# Patient Record
Sex: MALE | Race: OTHER | HISPANIC OR LATINO | URBAN - METROPOLITAN AREA
[De-identification: names, ages, dates, MRNs, and addresses within clinical notes are randomized per-mention and may not be internally consistent; named-entity substitution may affect disease eponyms.]

---

## 2021-11-27 ENCOUNTER — EMERGENCY (EMERGENCY)
Facility: HOSPITAL | Age: 21
LOS: 1 days | Discharge: HOME | End: 2021-11-27
Attending: EMERGENCY MEDICINE
Payer: MEDICARE

## 2021-11-27 VITALS
HEIGHT: 75 IN | RESPIRATION RATE: 20 BRPM | SYSTOLIC BLOOD PRESSURE: 143 MMHG | HEART RATE: 86 BPM | WEIGHT: 241.19 LBS | TEMPERATURE: 99 F | OXYGEN SATURATION: 94 % | DIASTOLIC BLOOD PRESSURE: 83 MMHG

## 2021-11-27 LAB — SARS-COV-2 RNA SPEC QL NAA+PROBE: SIGNIFICANT CHANGE UP

## 2021-11-27 PROCEDURE — 99283 EMERGENCY DEPT VISIT LOW MDM: CPT

## 2021-11-27 PROCEDURE — 99283 EMERGENCY DEPT VISIT LOW MDM: CPT | Mod: 25

## 2021-11-27 PROCEDURE — 94640 AIRWAY INHALATION TREATMENT: CPT

## 2021-11-27 PROCEDURE — U0005: CPT

## 2021-11-27 PROCEDURE — U0003: CPT

## 2021-11-27 PROCEDURE — 99053 MED SERV 10PM-8AM 24 HR FAC: CPT

## 2021-11-27 RX ORDER — FLUTICASONE PROPIONATE 50 MCG
1 SPRAY, SUSPENSION NASAL
Refills: 0 | Status: DISCONTINUED | OUTPATIENT
Start: 2021-11-27 | End: 2021-12-01

## 2021-11-27 RX ADMIN — Medication 1 SPRAY(S): at 02:41

## 2021-11-27 NOTE — ED PROVIDER NOTE - PHYSICAL EXAMINATION
Vital signs noted, see flowsheet.  General: NAD, well appearing, non-toxic.  HEENT: NC/AT, MMM. Auricles/tragi/mastoid non-tender b/l.  TM and ear canal normal b/l without erythema or bulging.  Conjunctiva and sclera clear b/l, EOMI, no ocular redness, discharge or swelling. +rhinorrhea, no sinus tenderness, +Nasal inflammation.  Mouth and pharynx with normal mucosa, no erythema, exudate or vesicles. Uvula midline.   Neck: Supple  Respiratory: No distress, CTA  Cardiac: RRR, pulses 2+ bilateral   Skin: No evidence of rash  Neuro: Awake, alert, oriented

## 2021-11-27 NOTE — ED PROVIDER NOTE - CARE PROVIDER_API CALL
Mathew Garcia)  Otolaryngology  [address] [place] [address], [place] [address]  Phone: [phone]  Fax: [phone]  Follow Up Time: 1-3 Days

## 2021-11-27 NOTE — ED PROVIDER NOTE - ATTENDING CONTRIBUTION TO CARE
Pt presenting with nasal congestion x 4 months. Uses Afrin daily. Physical exam shows swollen nasal turbinates R>L. Pt advised to stop using Afrin as it causes rebound congestion. Dc home with ENT and advised to use flonase.       I performed a history and physical exam of the patient and discussed their management with the advanced care provider. I reviewed the advanced care provider's note and agree with the documented findings and plan of care. My medical decision making and objective findings are found above.

## 2021-11-27 NOTE — ED PROVIDER NOTE - OBJECTIVE STATEMENT
19 y/o M with no PMHx presents to ED c/o nasal congestion, dry cough and intermittent sinus pressure for past 4 months. Pt report occasional mild headache but is not present currently. Reports difficulty breathing through his nose secondary to congestion. Pt using afrin multiple times per day without relief. Denies fever, chest pain, dyspnea, visual changes, abd pain.

## 2021-11-27 NOTE — ED PROVIDER NOTE - CLINICAL SUMMARY MEDICAL DECISION MAKING FREE TEXT BOX
21 y/o M with no PMHx presents to ED c/o nasal congestion, dry cough and intermittent sinus pressure for past 4 months. Also with asymptomatic hypertension  -Advised to DC afrin, start Claritin or zyrtec, will obtain COVID swab and outpatient f/u with ENT and PMD  -Discussed results, plan and return precautions with patient, pt verbalized understanding and agreement of plan

## 2021-11-27 NOTE — ED PROVIDER NOTE - NSFOLLOWUPINSTRUCTIONS_ED_ALL_ED_FT
- Rich un seguimiento con munoz médico de atención primaria en 1 o 2 días. Si no puede hacer un seguimiento con munoz médico de atención primaria, regrese al servicio de urgencias por cualquier problema urgente.  - Busque atención médica inmediata ante cualquier signo o síntoma nuevo, que empeore o que le preocupe.  - Si tiene dificultades para realizar un seguimiento, rasta goldberg: [phone] o visite [email or web address]-care para obtener un médico o especialista de [facility] que acepte munoz seguro en munoz área.      Si usted no tiene un médico de primaria por favor llame para hacer kristel christine en cualquiera de las clínicas de atención primaria que se enumeran a continuación:    [facility]  [address] [place] [address]  Phone: [phone]    [place] [address]  Phone: [phone]   ¡Sentirse mejor!       Rinitis alérgica    LO QUE NECESITA SABER:    La rinitis alérgica o fiebre del heno es la inflamación del interior de la nariz. La inflamación es kristel reacción a los alérgenos que se encuentran en el aire. Un alérgeno puede ser cualquier cosa que cause kristel reacción alérgica. Las alergias a diferentes tipos de maleza, césped, árboles o moho frecuentemente causan la rinitis alérgica. Los ácaros del polvo, las cucarachas, el pelo de las mascotas o el moho del interior de las mueller también pueden causar rinitis alérgica.    INSTRUCCIONES SOBRE EL MADDI HOSPITALARIA:    Rasta al 911 en gissel de presentar lo siguiente:  •Usted tiene dolor en el pecho o falta de aire.          Regrese a la noel de emergencias si:  •Usted tiene dolor intenso.      •Usted expectora maykel.      Comuníquese con munoz médico si:  •Tiene fiebre.      •Usted tiene dolor de oído o sinusitis, o dolor de scarlett.      •Ang[place] síntomas empeoran, aún después del tratamiento.      •A usted le sale de la nariz moco amarillo, verdoso, café o con maykel.      •Munoz nariz le sangra o le duele por dentro.      •Usted tiene dificultad para dormir debido a angeles síntomas.      •Usted tiene preguntas o inquietudes acerca de munoz condición o cuidado.      Medicamentos:  •Los medicamentosayudan a disminuir angeles síntomas y aclarar munoz congestión nasal.      •Petty angeles medicamentos kenisha se le haya indicado.Consulte con munoz médico si usted christine que munoz medicamento no le está ayudando o si presenta efectos secundarios. Infórmele si es alérgico a algún medicamento. Mantenga kristel lista actualizada de los medicamentos, las vitaminas y los productos herbales que vignesh. Incluya los siguientes datos de los medicamentos: cantidad, frecuencia y motivo de administración. Traiga con usted la lista o los envases de las píldoras a angeles citas de seguimiento. Lleve la lista de los medicamentos con usted en gissel de kristel emergencia.      Cómo manejar la rinitis alérgica:La mejor forma de manejar la rinitis alérgica es evitar alérgenos que puedan provocar angeles síntomas. Cualquiera de los siguientes puede llegar a disminuir angeles síntomas:   •Enjuáguese la nariz y los senos paranasalescon kristel solución de agua con sal o use un espray nasal de agua salina. [place] ayudará a diluir la mucosidad en la nariz eliminando el polen y la suciedad. También ayudará a reducir la inflamación para que usted pueda respirar normalmente. Pregúntele a munoz médico con cuánta frecuencia debe usted enjuagarse la nariz con el espray.      •Reduzca los ácaros del polvo.Lave sábanas y toallas en [place] kristel vez por semana. Cubra angeles almohadas y colchones con fundas libres de alérgenos. Limite el número de animales de olga y muñecos blandos que tiene munoz nathalia. Lave periódicamente en [place] los juguetes de munoz nathalia. Use kristel aspiradora que tenga filtro de aire y aspire semanalmente. Si es posible, deshágase de alfombras y fletcher. Estas recogen el polvo y los ácaros del polvo.      •Reduzca el polen.Mantenga las ventanas y miryam cerradas en la casa y brian. Permanezca adentro cuando el conteo de polen esté muy elevado. Encienda munoz aire acondicionado en reciclar y cambie los filtros de aire con frecuencia. Báñese y lávese el kulwinder antes de dormir todas las noches para ron el polen que haya acumulado en el kulwinder.      •Reduzca la caspa animal.Si es posible, no tenga gatos, perros, pájaros u otras mascotas. Si ya tiene mascotas en el hogar, manténgalas lejos de los dormitorios y habitaciones alfombradas. Báñelos con frecuencia.      •Reduzca el moho.No pase tiempo en sótanos. Compre plantas artificiales en vez de plantas de verdad. Mantenga la humedad de munoz hogar a menos de 45%. Asegúrese que no haya estanques y charcos en munoz casa o patio.      •No fume.Evite estar con otras personas que fumen. Pida información a munoz médico si usted actualmente fuma y necesita ayuda para dejar de fumar.      Acuda a angeles consultas de control con munoz médico según le indicaron.Es probable que usted tenga que ashley un especialista en alergias frecuentemente y para controlar angeles síntomas. Anote angeles preguntas para que se acuerde de hacerlas jackson angeles visitas.    - Munoz lectura de la presión arterial fue maddi mientras estaba en el [facility] de Emergencias, por favor controle munoz presión arterial en casa y rich un seguimiento con el médico de cabecera    La hipertensión, comúnmente llamada presión arterial maddi, es cuando la fuerza del bombeo de maykel a través de las arterias es demasiado arsalan. La hipertensión obliga al corazón a trabajar más luis angel para bombear maykel. [place] arterias pueden volverse estrechas o rígidas. Tener hipertensión no tratada o no controlada jackson un dominic período de tiempo puede causar ataque cardíaco, accidente cerebrovascular, enfermedad renal y otros problemas. Si comenzó con un medicamento, tómese exactamente kenisha se lo recetó munoz profesional de la nay.    BUSQUE ATENCIÓN MÉDICA INMEDIATA SI TIENE ALGUNO DE LOS SIGUIENTES SÍNTOMAS: dolor de scarlett intenso, cambios en munoz visión, confusión, dolor en el pecho, dolor abdominal, vómitos o dificultad para respirar.

## 2021-11-27 NOTE — ED PROVIDER NOTE - PATIENT PORTAL LINK FT
You can access the FollowMyHealth Patient Portal offered by [facility] by registering at the following website: [email or web address]. By joining [facility]’s FollowMyHealth portal, you will also be able to view your health information using other applications (apps) compatible with our system.

## 2021-11-27 NOTE — ED PROVIDER NOTE - DISCHARGE REVIEW MATERIAL PRESENTED
Anesthesia Evaluation     Patient summary reviewed and Nursing notes reviewed   NPO Solid Status: > 8 hours  NPO Liquid Status: > 8 hours           Airway   Mallampati: II  TM distance: >3 FB  Neck ROM: full  No difficulty expected  Dental - normal exam     Pulmonary - normal exam   (+) sleep apnea,   Cardiovascular - normal exam    ECG reviewed    (+) hypertension, valvular problems/murmurs, CAD, dysrhythmias Atrial Fib, angina, hyperlipidemia,       Neuro/Psych  (+) TIA, CVA, numbness, psychiatric history,     GI/Hepatic/Renal/Endo    (+)  hiatal hernia, GERD, PUD,  diabetes mellitus,     Musculoskeletal     Abdominal  - normal exam    Bowel sounds: normal.   Substance History      OB/GYN          Other   arthritis,    history of cancer                    Anesthesia Plan    ASA 3     MAC     intravenous induction          .

## 2022-05-16 ENCOUNTER — EMERGENCY (EMERGENCY)
Facility: HOSPITAL | Age: 22
LOS: 1 days | Discharge: HOME | End: 2022-05-16
Attending: EMERGENCY MEDICINE
Payer: SELF-PAY

## 2022-05-16 VITALS
RESPIRATION RATE: 20 BRPM | TEMPERATURE: 99 F | WEIGHT: 229.94 LBS | OXYGEN SATURATION: 95 % | HEART RATE: 103 BPM | SYSTOLIC BLOOD PRESSURE: 147 MMHG | DIASTOLIC BLOOD PRESSURE: 79 MMHG | HEIGHT: 75 IN

## 2022-05-16 VITALS
OXYGEN SATURATION: 96 % | RESPIRATION RATE: 20 BRPM | SYSTOLIC BLOOD PRESSURE: 149 MMHG | HEART RATE: 74 BPM | DIASTOLIC BLOOD PRESSURE: 77 MMHG | TEMPERATURE: 100 F

## 2022-05-16 LAB
FLUAV SUBTYP SPEC NAA+PROBE: DETECTED
RAPID RVP RESULT: DETECTED
SARS-COV-2 RNA SPEC QL NAA+PROBE: SIGNIFICANT CHANGE UP

## 2022-05-16 PROCEDURE — 99053 MED SERV 10PM-8AM 24 HR FAC: CPT

## 2022-05-16 PROCEDURE — 71045 X-RAY EXAM CHEST 1 VIEW: CPT

## 2022-05-16 PROCEDURE — 99284 EMERGENCY DEPT VISIT MOD MDM: CPT

## 2022-05-16 PROCEDURE — 71045 X-RAY EXAM CHEST 1 VIEW: CPT | Mod: 26

## 2022-05-16 PROCEDURE — 99285 EMERGENCY DEPT VISIT HI MDM: CPT | Mod: 25

## 2022-05-16 PROCEDURE — 0225U NFCT DS DNA&RNA 21 SARSCOV2: CPT

## 2022-05-16 NOTE — ED PROVIDER NOTE - ATTENDING APP SHARED VISIT CONTRIBUTION OF CARE
I personally saw the patient with the PA, and completed the key components of the history and physical exam. I then discussed the management plan with the PA.    · CONSTITUTIONAL: Well appearing, awake, alert, oriented to person, place, time/situation and in no apparent distress.  · EYES: Clear bilaterally, pupils equal, round and reactive to light.  · CARDIAC: Normal rate, regular rhythm.  Heart sounds S1, S2.  No murmurs, rubs or gallops.  · RESPIRATORY: Breath sounds clear and equal bilaterally.  · GASTROINTESTINAL: Abdomen soft, non-tender, no guarding.  · MUSCULOSKELETAL: Spine appears normal, range of motion is not limited, no muscle or joint tenderness  · NEUROLOGICAL: Alert and oriented, no focal deficits, no motor or sensory deficits.  · SKIN: Skin normal color for race, warm, dry and intact. No evidence of rash.

## 2022-05-16 NOTE — ED PROVIDER NOTE - OBJECTIVE STATEMENT
20 yo male no PMHx presents to ED for evaluation. Reports cough x1 week. Following cough, spits up phlegm. Tmax 102F 2 hours ago, medicated with acetaminophen 1000mg. Fever began yesterday morning. Eating/drinking normally. No further complaints at this time.   Denies body aches, abdominal pain, diarrhea.

## 2022-05-16 NOTE — ED PROVIDER NOTE - CLINICAL SUMMARY MEDICAL DECISION MAKING FREE TEXT BOX
22 yo male no PMHx presents to ED for evaluation of persistent cough. 20 yo male no PMHx presents to ED for evaluation of persistent cough. Nontox, afebrile. CXR negative. Improved with medications in ED. Patient to be discharged. Patient and/or guardian provided verbal/written discharge instructions and return precautions. Patient and/or guardian expresses understanding and agreement.

## 2022-05-16 NOTE — ED PROVIDER NOTE - NSFOLLOWUPINSTRUCTIONS_ED_ALL_ED_FT
- Prescription sent to pharmacy.  - Honey.   - Please bring all documentation from your ED visit to any related future follow up appointment.  - Please call to schedule follow up appointment with your primary care physician within 24-48 hours.  - Please seek immediate medical attention or return to the ED for any new/worsening, signs/symptoms, or concerns.    Feel better!    - Receta enviada a farmacia.  - Miel.  - Traiga toda la documentación de hamilton visita al ED a cualquier christine de seguimiento futura relacionada.  - Llame para programar kristel christine de seguimiento con hamilton médico de atención primaria dentro de las 24 a 48 horas.  - Busque atención médica inmediata o regrese al servicio de urgencias por cualquier signo/síntoma o inquietud nuevos/empeorados.    ¡Sentirse mejor!      Tos en los adultos    Cough, Adult      La tos es un reflejo que despeja la garganta y las vías respiratorias (sistema respiratorio). Toser ayuda a curar y proteger los pulmones. Es normal toser a veces, bobby si la tos aparece junto con otros síntomas o si dura mucho tiempo, puede indicar kristel afección que necesita tratamiento. Kristel tos aguda puede durar entre 2 o 3 semanas, mientras que kristel tos crónica puede durar 8 semanas o más tiempo.    Por lo general, las causas de la tos son las siguientes:  •Infección del sistema respiratoriopor virus o bacterias.      •Aspirar sustancias que irritan los pulmones.      •Alergias.      •Asma.      •Mucosidad que se desliza por la parte posterior de la garganta (goteo posnasal).      •Fumar.      •Ácido que vuelve del estómago hacia el esófago (reflujo gastroesofágico).      •Ciertos medicamentos.      •Problemas pulmonares crónicos.      •Otras enfermedades, kenisha insuficiencia cardíaca o un coágulo de maykel en el pulmón (embolia pulmonar).        Siga estas instrucciones en hamilton casa:    Medicamentos     •[place] los medicamentos de venta edouard y los recetados solamente kenisha se lo haya indicado el médico.      •Hable con el médico antes de val un medicamento para la tos (antitusivo).        Estilo de ravinder      •Evite el humo del cigarrillo. No consuma ningún producto que contenga nicotina o tabaco, kenisha cigarrillos, cigarrillos electrónicos y tabaco de mascar. Si necesita ayuda para dejar de fumar, consulte al médico.      •Estrada suficiente líquido kenisha para mantener la orina de color amarillo pálido.      •Evite la cafeína.      • No estrada alcohol si el médico se lo prohíbe.        Instrucciones generales      •Esté muy atento a los cambios en la tos. Informe al médico acerca de los cambios.      •Siempre cúbrase la boca al toser.      •Evite las cosas que lo carlos toser, kenisha perfumes, saroj, productos de limpieza o humo de fogatas o de tabaco.      •Si el aire está seco, use un humidificador o un vaporizador de jovon fría en la habitación o en la casa para ayudar a aflojar las secreciones.      •Si la tos empeora por la noche, pruebe a dormir en posición semierguida.      •Descanse todo lo que sea necesario.      •Concurra a todas las visitas de seguimiento kenisha se lo haya indicado el médico. [place] es importante.        Comuníquese con un médico si:    •Tiene nuevos síntomas.      •Tose y escupe pus.      •Tiene kristel tos que no mejora después de 2 o 3 semanas, o que empeora.      •No puede controlar la tos con antitusivos y no puede dormir debido a ello.      •Siente un dolor que empeora o un dolor que no se freddy con medicamentos.      •Tiene fiebre.      •Baja de peso sin causa aparente.      •Transpira jackson la noche.        Solicite ayuda inmediatamente si:    •Tose y escupe maykel.      •Tiene dificultad para respirar.      •El corazón le late muy rápido.      Estos síntomas pueden representar un problema grave que constituye kristel emergencia. No espere a ashley si los síntomas desaparecen. Solicite atención médica de inmediato. Comuníquese con el servicio de emergencias de hamilton localidad (911 en los [place]). No conduzca por denia propios medios hasta el hospital.       Resumen    •La tos es un reflejo que despeja la garganta y las vías respiratorias. Es normal toser a veces, bobby si la tos aparece junto con otros síntomas o si dura mucho tiempo, puede indicar kristel afección que necesita tratamiento.      •[place] los medicamentos de venta edouard y los recetados solamente kenisha se lo haya indicado el médico.      •Siempre cúbrase la boca al toser.      •Comuníquese con un médico si tiene síntomas nuevos, o kristel tos que no mejora después de 2 o 3 semanas o que empeora.      Esta información no tiene kenisha fin reemplazar el consejo del médico. Asegúrese de hacerle al médico cualquier pregunta que tenga. - Prescription sent to pharmacy.  - Ibuprofen 600mg every 6 hours as needed for fever.  - Acetaminophen 650mg every 6 hours as needed for fever.   - Honey.   - Please bring all documentation from your ED visit to any related future follow up appointment.  - Please call to schedule follow up appointment with your primary care physician within 24-48 hours.  - Please seek immediate medical attention or return to the ED for any new/worsening, signs/symptoms, or concerns.    Feel better!    - Receta enviada a farmacia.  - Ibuprofeno 600mg cada 6 horas según necesidad para la fiebre.  - Acetaminofén 650 mg cada 6 horas según sea necesario para la fiebre.  - Miel.  - Traiga toda la documentación de hamilton visita al ED a cualquier christine de seguimiento futura relacionada.  - Llame para programar kristel christine de seguimiento con hamilton médico de atención primaria dentro de las 24 a 48 horas.  - Busque atención médica inmediata o regrese al servicio de urgencias por cualquier signo/síntoma o inquietud nuevos/empeorados.    ¡Sentirse mejor!      Tos en los adultos    Cough, Adult      La tos es un reflejo que despeja la garganta y las vías respiratorias (sistema respiratorio). Toser ayuda a curar y proteger los pulmones. Es normal toser a veces, bobby si la tos aparece junto con otros síntomas o si dura mucho tiempo, puede indicar kristel afección que necesita tratamiento. Kristel tos aguda puede durar entre 2 o 3 semanas, mientras que kristel tos crónica puede durar 8 semanas o más tiempo.    Por lo general, las causas de la tos son las siguientes:  •Infección del sistema respiratoriopor virus o bacterias.      •Aspirar sustancias que irritan los pulmones.      •Alergias.      •Asma.      •Mucosidad que se desliza por la parte posterior de la garganta (goteo posnasal).      •Fumar.      •Ácido que vuelve del estómago hacia el esófago (reflujo gastroesofágico).      •Ciertos medicamentos.      •Problemas pulmonares crónicos.      •Otras enfermedades, kenisha insuficiencia cardíaca o un coágulo de maykel en el pulmón (embolia pulmonar).        Siga estas instrucciones en hamilton casa:    Medicamentos     •[place] los medicamentos de venta edouard y los recetados solamente kenisha se lo haya indicado el médico.      •Hable con el médico antes de val un medicamento para la tos (antitusivo).        Estilo de ravinder      •Evite el humo del cigarrillo. No consuma ningún producto que contenga nicotina o tabaco, kenisha cigarrillos, cigarrillos electrónicos y tabaco de mascar. Si necesita ayuda para dejar de fumar, consulte al médico.      •Estrada suficiente líquido kenisha para mantener la orina de color amarillo pálido.      •Evite la cafeína.      • No estrada alcohol si el médico se lo prohíbe.        Instrucciones generales      •Esté muy atento a los cambios en la tos. Informe al médico acerca de los cambios.      •Siempre cúbrase la boca al toser.      •Evite las cosas que lo carlos toser, kenisha perfumes, saroj, productos de limpieza o humo de fogatas o de tabaco.      •Si el aire está seco, use un humidificador o un vaporizador de jovon fría en la habitación o en la casa para ayudar a aflojar las secreciones.      •Si la tos empeora por la noche, pruebe a dormir en posición semierguida.      •Descanse todo lo que sea necesario.      •Concurra a todas las visitas de seguimiento kenisha se lo haya indicado el médico. [place] es importante.        Comuníquese con un médico si:    •Tiene nuevos síntomas.      •Tose y escupe pus.      •Tiene kritsel tos que no mejora después de 2 o 3 semanas, o que empeora.      •No puede controlar la tos con antitusivos y no puede dormir debido a ello.      •Siente un dolor que empeora o un dolor que no se freddy con medicamentos.      •Tiene fiebre.      •Baja de peso sin causa aparente.      •Transpira jackson la noche.        Solicite ayuda inmediatamente si:    •Tose y escupe maykel.      •Tiene dificultad para respirar.      •El corazón le late muy rápido.      Estos síntomas pueden representar un problema grave que constituye kristel emergencia. No espere a ashley si los síntomas desaparecen. Solicite atención médica de inmediato. Comuníquese con el servicio de emergencias de hamilton localidad (911 en los [place]). No conduzca por denia propios medios hasta el hospital.       Resumen    •La tos es un reflejo que despeja la garganta y las vías respiratorias. Es normal toser a veces, bobby si la tos aparece junto con otros síntomas o si dura mucho tiempo, puede indicar kristel afección que necesita tratamiento.      •[place] los medicamentos de venta edouard y los recetados solamente kenisha se lo haya indicado el médico.      •Siempre cúbrase la boca al toser.      •Comuníquese con un médico si tiene síntomas nuevos, o kristel tos que no mejora después de 2 o 3 semanas o que empeora.      Esta información no tiene kenisha fin reemplazar el consejo del médico. Asegúrese de hacerle al médico cualquier pregunta que tenga.

## 2022-05-16 NOTE — ED PROVIDER NOTE - NS ED ATTENDING STATEMENT MOD
This was a shared visit with the MARY JANE. I reviewed and verified the documentation and independently performed the documented:

## 2022-05-16 NOTE — ED PROVIDER NOTE - PATIENT PORTAL LINK FT
You can access the FollowMyHealth Patient Portal offered by [facility] by registering at the following website: [email or web address]. By joining [employer]’s FollowMyHealth portal, you will also be able to view your health information using other applications (apps) compatible with our system.

## 2023-02-02 NOTE — ED ADULT TRIAGE NOTE - NSWEIGHTCALCTOOLDRUG_GEN_A_CORE
PROCEDURES:  Open repair of inguinal hernia using mesh in adult 02-Feb-2023 10:05:41  Ramsey Chong    used

## 2024-01-30 ENCOUNTER — EMERGENCY (EMERGENCY)
Facility: HOSPITAL | Age: 24
LOS: 1 days | Discharge: HOME | End: 2024-01-30
Attending: EMERGENCY MEDICINE
Payer: COMMERCIAL

## 2024-01-30 VITALS
RESPIRATION RATE: 17 BRPM | SYSTOLIC BLOOD PRESSURE: 134 MMHG | DIASTOLIC BLOOD PRESSURE: 78 MMHG | TEMPERATURE: 99 F | OXYGEN SATURATION: 98 % | WEIGHT: 265.44 LBS | HEART RATE: 113 BPM

## 2024-01-30 LAB
FLUAV AG NPH QL: SIGNIFICANT CHANGE UP
FLUBV AG NPH QL: SIGNIFICANT CHANGE UP
RSV RNA NPH QL NAA+NON-PROBE: SIGNIFICANT CHANGE UP
SARS-COV-2 RNA SPEC QL NAA+PROBE: SIGNIFICANT CHANGE UP

## 2024-01-30 PROCEDURE — T1013: CPT

## 2024-01-30 PROCEDURE — 99284 EMERGENCY DEPT VISIT MOD MDM: CPT

## 2024-01-30 PROCEDURE — 87637 SARSCOV2&INF A&B&RSV AMP PRB: CPT

## 2024-01-30 PROCEDURE — 99283 EMERGENCY DEPT VISIT LOW MDM: CPT

## 2024-01-30 RX ORDER — IBUPROFEN 200 MG
600 TABLET ORAL ONCE
Refills: 0 | Status: COMPLETED | OUTPATIENT
Start: 2024-01-30 | End: 2024-01-30

## 2024-01-30 RX ORDER — ACETAMINOPHEN 500 MG
650 TABLET ORAL ONCE
Refills: 0 | Status: COMPLETED | OUTPATIENT
Start: 2024-01-30 | End: 2024-01-30

## 2024-01-30 RX ADMIN — Medication 650 MILLIGRAM(S): at 22:29

## 2024-01-30 RX ADMIN — Medication 650 MILLIGRAM(S): at 22:27

## 2024-01-30 RX ADMIN — Medication 600 MILLIGRAM(S): at 22:29

## 2024-01-30 RX ADMIN — Medication 600 MILLIGRAM(S): at 22:25

## 2024-01-30 NOTE — ED ADULT TRIAGE NOTE - CHIEF COMPLAINT QUOTE
pt c/o congestion ongoing for a year,  w/sore throat today.  Pt states he needs surgery for his sinuses, unable to explain why because he didn't understand the language.  States he came in today because of his sore throat.  Denies pain.

## 2024-01-30 NOTE — ED PROVIDER NOTE - PATIENT PORTAL LINK FT
You can access the FollowMyHealth Patient Portal offered by [facility] by registering at the following website: [email or web address]. By joining [employer]’s FollowMyHealth portal, you will also be able to view your health information using other applications (apps) compatible with our system.

## 2024-01-30 NOTE — ED PROVIDER NOTE - CLINICAL SUMMARY MEDICAL DECISION MAKING FREE TEXT BOX
24 yo male no PMHx presents to ED c/o worsening nasal congestion with associated sore throat. Patient likely with acute viral syndrome on top of chronic nasal congestion. Likely febrile. Well appearing. Sxm control, swab. Medically stable for discharge.

## 2024-01-30 NOTE — ED PROVIDER NOTE - PHYSICAL EXAMINATION
Gen: Nontoxic, well appearing, in NAD.  Skin: Warm and dry as visualized.  Head: NC/AT.  Eyes: PERRLA. EOMI. Eyes watery b/l.   Throat: Moist mucus membranes. Tonsils and pharynx without erythema or exudates. Tonsils not enlarged. No unilateral enlargement. Uvula midline, no edema or swelling, rises symmetrically.  Neck: Supple, FROM. Trachea midline.   Resp: No distress.  Cardio: Well perfused.  Ext: No deformities. MAEx4. FROM.   Neuro: A&Ox3. Appears nonfocal.   Psych: Normal affect and mood.

## 2024-01-30 NOTE — ED PROVIDER NOTE - NSFOLLOWUPINSTRUCTIONS_ED_ALL_ED_FT
- Ibuprofen 600mg every 6 hours as needed for fever.  - Acetaminophen 650mg every 6 hours as needed for fever.   - Stay hydrated.  - Please bring all documentation from your ED visit to any related future follow up appointment.  - Please call to schedule follow up appointment with your primary care physician within 24-48 hours.  - Please seek immediate medical attention or return to the ED for any new/worsening, signs/symptoms, or concerns.    Feel better!     - Ibuprofeno 600 mg cada 6 horas según sea necesario para la fiebre.  - Paracetamol 650 mg cada 6 horas según sea necesario para la fiebre.  - Mantente hidratado.  - Traiga toda la documentación de hamilton visita al servicio de urgencias a cualquier christine de seguimiento futura relacionada.  - Llame para programar kristel christine de seguimiento con hamilton médico de atención primaria dentro de las 24 a 48 horas.  - Busque atención médica inmediata o regrese al servicio de urgencias si detecta signos, síntomas o inquietudes nuevos o que empeoran.    ¡Sentirse mejor!        Enfermedades virales en los adultos    Viral Illness, Adult      Los virus son microbios diminutos que entran en el organismo de kristel persona y causan enfermedades. Hay muchos tipos de virus diferentes y causan muchas clases de enfermedades. Las enfermedades virales pueden ser leves o graves. Pueden afectar diferentes partes del cuerpo.    Entre las afecciones a corto plazo causadas por virus, se incluyen los resfríos y la gripe. Entre las afecciones a dominic plazo causadas por un virus, incluyen el herpes, la culebrilla y la infección por VIH (virus de inmunodeficiencia humana). Se gonzales identificado unos pocos virus asociados con determinados tipos de cáncer.      ¿Cuáles son las causas?    Muchos tipos de virus pueden causar enfermedades. Los virus invaden las células del organismo, se multiplican y provocan que las células infectadas funcionen de manera anormal o mueran. Cuando estas células mueren, liberan más virus. Cuando esto ocurre, aparecen síntomas de la enfermedad, y el virus sigue diseminándose a otras células. Si el virus asume la función de la célula, puede hacer que esta se divida y prolifere de manera descontrolada. Relampago ocurre cuando un virus causa cáncer.    Los diferentes virus ingresan al organismo de distintas formas. Puede contraer un virus de la siguiente manera:  •Al ingerir alimentos o beber agua que gonzales entrado en contacto con el virus (están contaminados).      •Al inhalar gotitas que kristel persona infectada liberó en el aire al toser o estornudar.      •Al tocar kristel superficie contaminada con el virus y luego llevarse la mano a la boca, la nariz o los ojos.      •Al ser nadir por un insecto o mordido por un animal que son portadores del virus.      •Al tener contacto sexual con kristel persona infectada por el virus.      •Al tener contacto con maykel o líquidos que contienen el virus, ya sea a través de un cait abierto o jackson kristel transfusión.      Si el virus ingresa al organismo, el sistema de defensa del cuerpo (sistema inmunitario) intentará combatirlo. Puede correr un riesgo más alto de tener kristel enfermedad viral si tiene el sistema inmunitario debilitado.      ¿Cuáles son los signos o síntomas?    Puede tener los siguientes síntomas, dependiendo del tipo de virus y de la ubicación de las células que invade:•Virus del resfrío y de la gripe:  •Fiebre.      •Dolor de scarlett.      •Dolor de garganta.      •Alicia musculares.      •Nariz tapada (congestión nasal).      •Tos.      •Virus del aparato digestivo (gastrointestinales):  •Fiebre.      •Dolor en el abdomen.      •Náuseas.      •Diarrea.      •Virus hepáticos (hepatitis):  •Pérdida del apetito.      •Cansancio.      •La piel o las partes sandy de los ojos se ponen josh (ictericia).      •Virus del cerebro y la médula muñoz:  •Fiebre.      •Dolor de scarlett.      •Rigidez en el adán.      •Náuseas y vómitos.      •Confusión o somnolencia.      •Virus de la piel:  •Verrugas.      •Picazón.      •Erupción cutánea.      •Virus de transmisión sexual:  •Secreción.      •Hinchazón.      •Enrojecimiento.      •Erupción cutánea.          ¿Cómo se diagnostica?    Esta afección se puede diagnosticar en función de lo siguiente:  •Síntomas.      •Antecedentes médicos.      •Examen físico.      •Análisis de maykel, kristel muestra de mucosidad de los pulmones (muestra de esputo), muestra de heces o un hisopado de líquidos corporales o kristel llaga de la piel (lesión).        ¿Cómo se trata?    Los virus pueden ser difíciles de tratar porque se hospedan en las células. Los antibióticos no tratan los virus porque estos medicamentos no llegan al interior de las células. El tratamiento de kristel enfermedad viral puede incluir lo siguiente:  •Descansar y beber abundantes líquidos.      •Medicamentos para aliviar los síntomas. Estos pueden incluir medicamentos de venta edouard para el dolor y la fiebre, medicamentos para la tos o la congestión y medicamentos para aliviar la diarrea.      •Medicamentos antivirales. Estos medicamentos están disponibles únicamente para ciertos tipos de virus.      Algunas enfermedades virales pueden evitarse con vacunas. Un ejemplo frecuente es la vacuna antigripal.      Siga estas instrucciones en hamilton casa:    Medicamentos     •Use los medicamentos de venta edouard y los recetados solamente kenisha se lo haya indicado el médico.      •Si le recetaron un medicamento antiviral, tómelo kenisha se lo haya indicado el médico. No deje de val el antiviral aunque comience a sentirse mejor.    •Infórmese sobre cuándo los antibióticos son necesarios y cuándo no. Los antibióticos no combaten a los virus. Si tiene kristel infección viral y el médico christine que también tiene kristel infección bacteriana, o que está en riesgo de contraerla, jean carlos vez le recete un antibiótico.  •No solicite kristel receta para antibióticos si le gonzales diagnosticado kristel enfermedad viral. Los antibióticos no harán que se cure más rápidamente.      •Val antibióticos con frecuencia cuando no son necesarios puede derivar en resistencia a los antibióticos. Cuando esto ocurre, el medicamento pierde hamilton eficacia contra las bacterias que normalmente combate.          Indicaciones generales      •Jackie suficiente líquido para mantener la orina de color amarillo pálido.      •Descanse todo lo que pueda.      •Retome angeles actividades normales según lo indicado por el médico. Pregúntele al médico qué actividades son seguras para usted.      •Concurra a todas las visitas de seguimiento kenisha se lo haya indicado el médico. Relampago es importante.        ¿Cómo se previene?     Para reducir el riesgo de tener kristel enfermedad viral:  •Lávese las nicholas frecuentemente con agua y jabón jackson al menos 20 segundos. Use desinfectante para nicholas si no dispone de agua y jabón.      •Evite tocarse la nariz, los ojos y la boca, sobre todo si no se lavó las nicholas recientemente.      •Si un miembro de la phani tiene kristel infección viral, limpie todas las superficies de la casa que puedan sterling estado en contacto con el virus. Use [place] y jabón. También puede usar lejía con agua agregada (diluido).      •Manténgase lejos de las personas enfermas con síntomas de kristel infección viral.      •No comparta objetos tales kenisha cepillos de dientes y botellas de agua con otras personas.      •Mantenga las vacunas al día. Relampago incluye recibir la vacuna antigripal todos los años.      •Siga kristel dieta saludable y descanse lo suficiente.        Comuníquese con un médico si:    •Tiene síntomas de kristel enfermedad viral que no desaparecen.      •Los síntomas regresan después de sterling desaparecido.      •Angeles síntomas empeoran.        Solicite ayuda de inmediato si tiene:    •Dificultad para respirar.      •Dolor de scarlett intenso o rigidez en el adán.      •Vómitos abundantes o dolor en el abdomen.      Estos síntomas pueden representar un problema grave que constituye kristel emergencia. No espere a ashley si los síntomas desaparecen. Solicite atención médica de inmediato. Comuníquese con el servicio de emergencias de hamilton localidad (911 en los [place]). No conduzca por angeles propios medios hasta el hospital.       Resumen    •Los virus son tipos de microbios que entran en el organismo de kristel persona y causan enfermedades. Las enfermedades virales pueden ser leves o graves. Pueden afectar diferentes partes del cuerpo.      •Los virus pueden ser difíciles de tratar. Hay medicamentos para aliviar los síntomas y hay algunos medicamentos antivirales.      •Si le recetaron un medicamento antiviral, tómelo kenisha se lo haya indicado el médico. No deje de val el antiviral aunque comience a sentirse mejor.      •Comuníquese con un médico si tiene síntomas de kristel enfermedad viral que no desaparecen.      Esta información no tiene kenisha fin reemplazar el consejo del médico. Asegúrese de hacerle al médico cualquier pregunta que tenga.

## 2024-01-30 NOTE — ED PROVIDER NOTE - CARE PROVIDER_API CALL
Juarez Foy  Otolaryngology  [address] [facility], [address] [address]  Phone: [phone]  Fax: [phone]  Follow Up Time:

## 2024-01-30 NOTE — ED PROVIDER NOTE - ATTENDING APP SHARED VISIT CONTRIBUTION OF CARE
Pt with one day of nasal congestion, eye tearing, mild cough cw viral syndrome.  Plan for supportive care and swabs

## 2024-01-30 NOTE — ED PROVIDER NOTE - OBJECTIVE STATEMENT
24 yo male no PMHx presents to ED c/o nasal congestion x2 years. States slept with mouth open last night and now throat is irritated. +Watery eyes. Did not self medicate PTA. No further complaints at this time.  Denies fevers.

## 2024-09-16 ENCOUNTER — EMERGENCY (EMERGENCY)
Facility: HOSPITAL | Age: 24
LOS: 1 days | Discharge: HOME | End: 2024-09-16
Attending: STUDENT IN AN ORGANIZED HEALTH CARE EDUCATION/TRAINING PROGRAM
Payer: SELF-PAY

## 2024-09-16 VITALS
HEART RATE: 63 BPM | OXYGEN SATURATION: 99 % | WEIGHT: 263.89 LBS | SYSTOLIC BLOOD PRESSURE: 135 MMHG | DIASTOLIC BLOOD PRESSURE: 80 MMHG | TEMPERATURE: 99 F | RESPIRATION RATE: 14 BRPM

## 2024-09-16 VITALS
HEART RATE: 71 BPM | OXYGEN SATURATION: 97 % | SYSTOLIC BLOOD PRESSURE: 137 MMHG | DIASTOLIC BLOOD PRESSURE: 73 MMHG | TEMPERATURE: 98 F | RESPIRATION RATE: 14 BRPM

## 2024-09-16 LAB
ANION GAP SERPL CALC-SCNC: 12 MMOL/L — SIGNIFICANT CHANGE UP (ref 5–17)
BUN SERPL-MCNC: 18.2 MG/DL — SIGNIFICANT CHANGE UP (ref 8–20)
CALCIUM SERPL-MCNC: 9.1 MG/DL — SIGNIFICANT CHANGE UP (ref 8.4–10.5)
CHLORIDE SERPL-SCNC: 101 MMOL/L — SIGNIFICANT CHANGE UP (ref 96–108)
CO2 SERPL-SCNC: 27 MMOL/L — SIGNIFICANT CHANGE UP (ref 22–29)
CREAT SERPL-MCNC: 1.27 MG/DL — SIGNIFICANT CHANGE UP (ref 0.5–1.3)
EGFR: 82 ML/MIN/1.73M2 — SIGNIFICANT CHANGE UP
GLUCOSE SERPL-MCNC: 86 MG/DL — SIGNIFICANT CHANGE UP (ref 70–99)
HCT VFR BLD CALC: 44.4 % — SIGNIFICANT CHANGE UP (ref 39–50)
HGB BLD-MCNC: 14.5 G/DL — SIGNIFICANT CHANGE UP (ref 13–17)
MCHC RBC-ENTMCNC: 27.5 PG — SIGNIFICANT CHANGE UP (ref 27–34)
MCHC RBC-ENTMCNC: 32.7 GM/DL — SIGNIFICANT CHANGE UP (ref 32–36)
MCV RBC AUTO: 84.1 FL — SIGNIFICANT CHANGE UP (ref 80–100)
PLATELET # BLD AUTO: 286 K/UL — SIGNIFICANT CHANGE UP (ref 150–400)
POTASSIUM SERPL-MCNC: 4.5 MMOL/L — SIGNIFICANT CHANGE UP (ref 3.5–5.3)
POTASSIUM SERPL-SCNC: 4.5 MMOL/L — SIGNIFICANT CHANGE UP (ref 3.5–5.3)
RBC # BLD: 5.28 M/UL — SIGNIFICANT CHANGE UP (ref 4.2–5.8)
RBC # FLD: 12.8 % — SIGNIFICANT CHANGE UP (ref 10.3–14.5)
SODIUM SERPL-SCNC: 140 MMOL/L — SIGNIFICANT CHANGE UP (ref 135–145)
WBC # BLD: 7.73 K/UL — SIGNIFICANT CHANGE UP (ref 3.8–10.5)
WBC # FLD AUTO: 7.73 K/UL — SIGNIFICANT CHANGE UP (ref 3.8–10.5)

## 2024-09-16 PROCEDURE — 70498 CT ANGIOGRAPHY NECK: CPT | Mod: 26,MC

## 2024-09-16 PROCEDURE — 99285 EMERGENCY DEPT VISIT HI MDM: CPT

## 2024-09-16 PROCEDURE — 70496 CT ANGIOGRAPHY HEAD: CPT | Mod: 26,MC

## 2024-09-16 NOTE — ED PROVIDER NOTE - PHYSICAL EXAMINATION
Gen: Nontoxic, well appearing, in NAD.  Skin: Warm and dry as visualized.  Head: NC/AT.  Eyes: PERRLA. EOMI.  Neck: Supple, FROM. Trachea midline.   Resp: No distress.  Cardio: Well perfused.  MSK: FARNSWORTH x4. Global strength 5/5.  Neuro: A&Ox3. GCS 15. Sensation intact to bilateral upper and lower extremities. Normal point-to-point and heel-to-shin test. Normal rapid alternating movements. Normal gait, no ataxia.  Psych: Normal mood and affect.

## 2024-09-16 NOTE — ED PROVIDER NOTE - PATIENT PORTAL LINK FT
You can access the FollowMyHealth Patient Portal offered by [facility] by registering at the following website: [email or web address]. By joining [facility]’s FollowMyHealth portal, you will also be able to view your health information using other applications (apps) compatible with our system.

## 2024-09-16 NOTE — ED PROVIDER NOTE - OBJECTIVE STATEMENT
22 yo male no PMHx presents to ED c/o headache x2 weeks. Pain to b/l temples, intermittent. Worse with bending down, laying down, and with exerting self. Comes in sharp waves then resolves. Medicating with 400mg once daily without improvement. No current pain. No further complaints at this time.   Denies head injury, neck pain, seizures, photosensitivity, syncope, fevers, vomiting. 24 yo male no PMHx presents to ED c/o headache x2 weeks. Pain to b/l temples, intermittent. Worse with bending down, laying down, and with exerting self. Comes in sharp waves then resolves. Medicating with 400mg once daily without improvement. No current pain. Has had headaches in past, but nothing that lasts this long. No further complaints at this time.   Denies head injury, neck pain, seizures, photosensitivity, syncope, fevers, vomiting.

## 2024-09-16 NOTE — ED PROVIDER NOTE - CARE PROVIDER_API CALL
Fred Finch  Neurology  [address] [place] [address], [facility] [address]  [place] [address]  Phone: [phone]  Fax: [phone]  Follow Up Time:

## 2024-09-16 NOTE — ED PROVIDER NOTE - NSFOLLOWUPINSTRUCTIONS_ED_ALL_ED_FT
- Ibuprofen 600mg every 6 hours as needed for pain.  - Acetaminophen 650mg every 6 hours as needed for pain.   - Please bring all documentation from your ED visit to any related future follow up appointment.  - Please call to schedule follow up appointment with your primary care physician within 24-48 hours.  - Please seek immediate medical attention for any new/worsening, signs/symptoms, or concerns.    Feel better!    - Ibuprofeno 600mg cada 6 horas según necesidad para el dolor.  - Acetaminofén 650 mg cada 6 horas según sea necesario para el dolor.   - Traiga toda la documentación de hamilton visita al servicio de urgencias a cualquier christine de seguimiento futura relacionada.  - Llame para programar kristel christine de seguimiento con hamilton médico de atención primaria dentro de las 24 a 48 horas.  - Busque atención médica inmediata ante cualquier nuevo / empeoramiento, signos / síntomas o inquietudes.    ¡Sentirse mejor!       Dolor de scarlett general sin causa    General Headache Without Cause      El dolor de scarlett es un dolor o malestar que se siente en la anabell de la scarlett o del adán. Hay muchas causas y tipos de chaz de scarlett. En algunos casos, es posible que no se encuentre la causa.      Siga estas indicaciones en hamilton casa:    Controle hamilton afección para detectar cualquier cambio. Infórmele a hamilton médico acerca de los cambios. Siga estos pasos para controlar hamilton afección:      Control del dolor                   •[place] los medicamentos de venta eduoard y los recetados solamente kenisha se lo haya indicado el médico.      •Cuando sienta dolor de scarlett acuéstese en un cuarto oscuro y tranquilo.    •Si se lo indican, aplíquese hielo en la scarlett y en la anabell del adán:  •Ponga el hielo en kristel bolsa plástica.      •Coloque kristel toalla entre la piel y la bolsa.      •Coloque el hielo jackson 20 minutos, 2 a 3 veces al día.      •Si se lo indican, aplique calor en la anabell afectada. Use la higinio de calor que el médico le recomiende, kenisha kristel compresa de calor húmedo o kristel almohadilla térmica.   • Coloque kristel toalla entre la piel y la higinio de calor.       •Aplique calor jackson 20 a 30 minutos.       •Retire la higinio de calor si la piel se pone de color zarate brillante. [place] es muy importante si no puede sentir dolor, calor o frío. Puede correr un riesgo mayor de sufrir quemaduras.        •Mantenga las luces tenues si las luces brillantes le molestan o angeles chaz de scarlett empeoran.      Comida y bebida     •Mantenga un horario para las comidas.    •Si dwayne alcohol: •Limite la cantidad que dwayne a lo siguiente:   •De 0 a 1 medida por día para las mujeres.       • De 0 a 2 medidas por día para los hombres.         •Esté atento a la cantidad de alcohol que hay en las bebidas que vignesh. En los [place], kristel medida equivale a kristel botella de cerveza de 12 oz (355 ml), un vaso de vino de 5 oz (148 ml) o un vaso de kristel bebida alcohólica de peter graduación de 1½ oz (44 ml).        •Deje de val cafeína o reduzca la cantidad que consume.        Indicaciones generales    •Lleve un registro diario para averiguar si ciertas cosas provocan los chaz de scarlett. Registre, por ejemplo, lo siguiente:  •Lo que usted come y dwayne.      •El tiempo que duerme.      •Algún cambio en hamilton dieta o en los medicamentos.        •Hágase masajes o pruebe otras formas de relajarse.      •Limite el estrés.      •Siéntese con la espalda recta. No contraiga (tensione) los músculos.      • No consuma ningún producto que contenga nicotina o tabaco. Estos incluyen los cigarrillos, el tabaco para mascar y los cigarrillos electrónicos. Si necesita ayuda para dejar de fumar, consulte al médico.      •Victoriano ejercicios con regularidad jean carlos kenisha se lo indicó el médico.      •Duerma lo suficiente. [place] a menudo significa entre 7 y 9 horas de sueño cada noche.      •Concurra a todas las visitas de control kenisha se lo haya indicado el médico. [place] es importante.        Comuníquese con un médico si:    •Los medicamentos no logran aliviar los síntomas.      •Tiene un dolor de scarlett que es diferente a los otros chaz de scarlett.      •Tiene malestar estomacal (náuseas) o vomita.      •Tiene fiebre.        Solicite ayuda inmediatamente si:    •El dolor de scarlett empeora rápidamente.      •El dolor empeora después de hacer mucha actividad física.      •Sigue vomitando.      •Presenta rigidez en el adán.      •Tiene dificultad para ashley.      •Tiene dificultad para hablar.      •Siente dolor en el horace o en el oído.      •Angeles músculos están débiles, o pierde el control muscular.      •Pierde el equilibrio o tiene problemas para caminar.      •Siente que va a desvanecerse (perder el conocimiento) o se desmaya.      •Está desorientado (confundido).      •Tiene kristel convulsión.        Resumen    •El dolor de scarlett es un dolor o malestar que se siente en la anabell de la scarlett o del adán.      •Hay muchas causas y tipos de chaz de scarlett. En algunos casos, es posible que no se encuentre la causa.      •Lleve un diario kenisha ayuda para determinar la causa de los chaz de scarlett. Controle hamilton afección para detectar cualquier cambio. Infórmele a hamilton médico acerca de los cambios.      •Comuníquese con un médico si tiene un dolor de scarlett que es diferente de lo habitual o si el dolor de scarlett no se freddy con los medicamentos.      •Solicite ayuda de inmediato si el dolor de scarlett es muy intenso, vomita, tiene dificultad para ashley, pierde el equilibrio o tiene kristel convulsión.      Esta información no tiene kenisha fin reemplazar el consejo del médico. Asegúrese de hacerle al médico cualquier pregunta que tenga.

## 2024-09-16 NOTE — ED PROVIDER NOTE - CLINICAL SUMMARY MEDICAL DECISION MAKING FREE TEXT BOX
24 yo male no PMHx presents to ED c/o intermittent headache to b/l temples x2 weeks. 22 yo male no PMHx presents to ED c/o intermittent headache to b/l temples x2 weeks. Labs negative. CT negative aside from ?sinusitis. Patient without sxms. Medically stable for discharge.

## 2024-09-16 NOTE — ED ADULT NURSE NOTE - OBJECTIVE STATEMENT
Patient presents to ED complaining of headache x2weeks. Patient resting comfortably in bed. No acute distress noted. Patient to have IV access and labs drawn. Awaiting CT.

## 2024-09-16 NOTE — ED PROVIDER NOTE - ATTENDING APP SHARED VISIT CONTRIBUTION OF CARE
I, Shorty Bowman MD, have seen and examined the patient on the date of service.  I agree with the MARY JANE's assessment as written, with exceptions or additions as noted below or in a separate note.  Patient with no significant past medical history is here with headache x 2 weeks.  States the pain is intermittent, worse with bending down and other positional changes.  No associated nausea or vomiting.  No fevers.  No neck pain/stiffness.  On exam he has no localizing neurologic deficits.  He appears well.  Given the positional headache and intermittent headache, will check CTA to evaluate for mass as well as aneurysm with sentinel bleed.  However his overall exam is reassuring.  Do not suspect meningitis.  Will check lab work in preparation for CT scan, reassess.

## 2024-09-17 PROCEDURE — T1013: CPT

## 2024-09-17 PROCEDURE — 70496 CT ANGIOGRAPHY HEAD: CPT | Mod: MC

## 2024-09-17 PROCEDURE — 85027 COMPLETE CBC AUTOMATED: CPT

## 2024-09-17 PROCEDURE — 70498 CT ANGIOGRAPHY NECK: CPT | Mod: MC

## 2024-09-17 PROCEDURE — 80048 BASIC METABOLIC PNL TOTAL CA: CPT

## 2024-09-17 PROCEDURE — 36415 COLL VENOUS BLD VENIPUNCTURE: CPT

## 2024-09-17 PROCEDURE — 70450 CT HEAD/BRAIN W/O DYE: CPT | Mod: MC

## 2024-09-17 PROCEDURE — 99284 EMERGENCY DEPT VISIT MOD MDM: CPT | Mod: 25
